# Patient Record
(demographics unavailable — no encounter records)

---

## 2024-10-09 NOTE — DISCUSSION/SUMMARY
[FreeTextEntry1] : 62M +tob presents to establish care  afib -single episode, hospitalized, converted to SR -asymptomatic since -ekg today showing SR -will check 3 day externed holter to assess for further afib burden  pt with multiple risk factors for CV dz (age, tob) -at next visit will discuss stress testing, carotid, AAA  f/u 2 months or sooner as needed 50 min spent on complete encounter.   [EKG obtained to assist in diagnosis and management of assessed problem(s)] : EKG obtained to assist in diagnosis and management of assessed problem(s)

## 2024-10-09 NOTE — CARDIOLOGY SUMMARY
[de-identified] : 9/24: FINDINGS:   Left Ventricle: The left ventricular cavity is normal in size. Left ventricular wall thickness is normal. Left ventricular systolic function is normal with a calculated ejection fraction of 54 % by 3D. There are no regional wall motion abnormalities seen. There is normal left ventricular diastolic function, with normal left ventricular filling pressure. Left ventricular global longitudinal strain is -18.5 % which is normal (< -18%). Images were acquired on a [employer] ultrasound system and processed using [employer] strain analysis software with a heart rate of 70 bpm and a blood pressure of 107/71 mmHg.   Right Ventricle: The right ventricular cavity is normal in size and right ventricular systolic function is normal. Tricuspid annular plane systolic excursion (TAPSE) is 2.2 cm (normal >=1.7 cm).   Left Atrium: The left atrium is normal in size with an indexed volume of 21.47 ml/m.   Right Atrium: The right atrium is normal in size with an indexed volume of 19.73 ml/m.   Interatrial Septum: The interatrial septum was not well visualized.   Aortic Valve: The aortic valve is tricuspid with normal leaflet excursion. There is no aortic valve stenosis. There is no evidence of aortic regurgitation.   Mitral Valve: Structurally normal mitral valve with normal leaflet excursion. There is no mitral valve stenosis. There is mild mitral regurgitation.   Tricuspid Valve: Structurally normal tricuspid valve with normal leaflet excursion. There is trace tricuspid regurgitation. Estimated pulmonary artery systolic pressure is 28 mmHg.   Pulmonic Valve: Structurally normal pulmonic valve with normal leaflet excursion. There is trace pulmonic regurgitation.   Aorta: The aortic root and ascending aorta appear normal in size.   Pericardium: No pericardial effusion seen.   Systemic Veins: The inferior vena cava is normal in size (normal <2.1cm) with normal inspiratory collapse (normal >50%) consistent with normal right atrial pressure (~3, range 0-5mmHg).

## 2024-10-09 NOTE — HISTORY OF PRESENT ILLNESS
[FreeTextEntry1] : 62M +tob presents to establish care Sent in by PMD: Dr Platt  pt seen by pmd 9/24 with fever and malaise, found to be in afib in the office ?new, at 150s. was sent from pmd to ER, was at the hospital for several days, states he converted to SR and was sent home.  today pt feeling well. no complaints. ekg showing SR, with APCs denies CP, SOB, at rest or on light exertion. Denies palpitations, dizziness, diaphoresis, syncope, LE edema, orthopnea  pt denies prev known episodes of afib  Exercise: volleyball, no issues Diet: none   Prev cardiac history: Previous cardiac testing: Recent labs:  EKG: SR APCs  Med hx: none	 Sx hx: none	 Family hx: no known cardiac hx Social hx:  lives in [place] with dig. . + tob. no etoh/drugs Meds: none Allergies: nkda

## 2024-11-15 NOTE — HISTORY OF PRESENT ILLNESS
[FreeTextEntry1] : Mr. Mika Reed is a 62-year-old man with prior alcohol use and recently diagnosed atrial fibrillation. He presents today for an initial evaluation.  To briefly summarize his history, he was sent to the Emergency Department at [facility] from his Primary Care Physician's office for a new diagnosis of atrial fibrillation in September of 2024. He presented to his physician's office with a fever and dizziness. While hospitalized, he spontaneously converted to sinus rhythm and was discharged without oral anticoagulation due to a CHADS2-VASC score of zero and thrombocytopenia. A Zio monitor worn from October 9th, 2024 through October 13th, 2024 revealed a 27% burden of atrial fibrillation.  Her reports symptoms of mild palpitations. He typically notices this symptom while he is at rest. No reports of chest pain, shortness of breath, dizziness, lightheadedness, pre-syncope or syncope. He works as an  and smokes cigarettes.   CHADS2-VASC: 0

## 2024-11-15 NOTE — CARDIOLOGY SUMMARY
[de-identified] : ECG from 11/15/2024: Sinus bradycardia at 52bpm ECG from 9/4/2024: AF at 147bpm [de-identified] : Salvatore from 10/9/2024-10/13/2024 (personally reviewed): min HR: 49, max HR: 211, mean HR: 86, no patient triggered events, 27% burden of AF, longest duration 1 hour, 59 min, HR range during AF:  (mean 132), 37 episodes of pSVT (Longest 12 beats), 7.4% APCs [de-identified] : TTE from 9/6/2024: EF: 54%, normal RV size and systolic function, normal LA (JASWANT: 21) and RA, no significant valvular disease, no pericardial effusion

## 2024-11-15 NOTE — CARDIOLOGY SUMMARY
[de-identified] : ECG from 11/15/2024: Sinus bradycardia at 52bpm ECG from 9/4/2024: AF at 147bpm [de-identified] : Salvatore from 10/9/2024-10/13/2024 (personally reviewed): min HR: 49, max HR: 211, mean HR: 86, no patient triggered events, 27% burden of AF, longest duration 1 hour, 59 min, HR range during AF:  (mean 132), 37 episodes of pSVT (Longest 12 beats), 7.4% APCs [de-identified] : TTE from 9/6/2024: EF: 54%, normal RV size and systolic function, normal LA (JASWANT: 21) and RA, no significant valvular disease, no pericardial effusion

## 2024-11-15 NOTE — HISTORY OF PRESENT ILLNESS
[FreeTextEntry1] : Mr. Mika Reed is a 62-year-old man with prior alcohol use and recently diagnosed atrial fibrillation. He presents today for an initial evaluation.  To briefly summarize his history, he was sent to the Emergency Department at [facility] from his Primary Care Physician's office for a new diagnosis of atrial fibrillation in September of 2024. He presented to his physician's office with a fever and dizziness. While hospitalized, he spontaneously converted to sinus rhythm and was discharged without oral anticoagulation due to a CHADS2-VASC score of zero and thrombocytopenia. A Zio monitor worn from October 9th, 2024 through October 13th, 2024 revealed a 27% burden of atrial fibrillation.  Her reports symptoms of mild palpitations. He typically notices this symptom while he is at rest. No reports of chest pain, shortness of breath, dizziness, lightheadedness, pre-syncope or syncope. He works as an  and smokes cigarettes.   CHADS2-VASC: 0

## 2024-11-15 NOTE — CARDIOLOGY SUMMARY
[de-identified] : ECG from 11/15/2024: Sinus bradycardia at 52bpm ECG from 9/4/2024: AF at 147bpm [de-identified] : Salvatore from 10/9/2024-10/13/2024 (personally reviewed): min HR: 49, max HR: 211, mean HR: 86, no patient triggered events, 27% burden of AF, longest duration 1 hour, 59 min, HR range during AF:  (mean 132), 37 episodes of pSVT (Longest 12 beats), 7.4% APCs [de-identified] : TTE from 9/6/2024: EF: 54%, normal RV size and systolic function, normal LA (JASWANT: 21) and RA, no significant valvular disease, no pericardial effusion

## 2024-11-15 NOTE — HISTORY OF PRESENT ILLNESS
[FreeTextEntry1] : Mr. Mika Reed is a 62-year-old man with prior alcohol use and recently diagnosed atrial fibrillation. He presents today for an initial evaluation.  To briefly summarize his history, he was sent to the Emergency Department at [facility] from his Primary Care Physician's office for a new diagnosis of atrial fibrillation in September of 2024. He presented to his physician's office with a fever and dizziness. While hospitalized, he spontaneously converted to sinus rhythm and was discharged without oral anticoagulation due to a CHADS2-VASC score of zero and thrombocytopenia. A Zio monitor worn from October 9th, 2024 through October 13th, 2024 revealed a 27% burden of atrial fibrillation.  Her reports symptoms of mild palpitations. He typically notices this symptom while he is at rest. No reports of chest pain, shortness of breath, dizziness, lightheadedness, pre-syncope or syncope. He works as an  and smokes cigarettes.   CHADS2-VASC: 0

## 2024-11-15 NOTE — DISCUSSION/SUMMARY
[FreeTextEntry1] : Mr. Mika Reed is a 62-year-old man with prior alcohol use and recently diagnosed atrial fibrillation. He presents today for an initial evaluation. He reports having mild to no symptoms that are attributable to his atrial fibrillation.   We spoke about the benefits of early rhythm control in patients with atrial fibrillation. His sinus bradycardia on Metoprolol means that he may not be an ideal candidate for an antiarrhythmic medication. He is currently taking Metoprolol tartrate 25mg three times per day. I recommended that he change his Metoprolol Succinate to 75mg daily. Although he has sinus bradycardia, his average ventricular rates were in the 130s during atrial fibrillation on his monitor. Although his heart rates were not below goal at the time of his last monitor, his sinus bradycardia makes me reluctant to increase his Metoprolol at this time. I introduced the idea of a catheter ablation. His preference is to pursue a rate control strategy at this time. We also discussed risk factor modification and his CHADS2-VASC score of zero. For the time being he intends to continue with oral anticoagulation. I recommended that he continue to follow with Dr. Ballard and see me again if he chooses to move forward with a rhythm control strategy.  [EKG obtained to assist in diagnosis and management of assessed problem(s)] : EKG obtained to assist in diagnosis and management of assessed problem(s)